# Patient Record
Sex: FEMALE | Race: WHITE | Employment: OTHER | URBAN - METROPOLITAN AREA
[De-identification: names, ages, dates, MRNs, and addresses within clinical notes are randomized per-mention and may not be internally consistent; named-entity substitution may affect disease eponyms.]

---

## 2017-04-06 ENCOUNTER — HOSPITAL ENCOUNTER (OUTPATIENT)
Dept: MAMMOGRAPHY | Age: 62
Discharge: HOME | End: 2017-04-06
Attending: SURGERY | Admitting: SURGERY

## 2017-04-06 DIAGNOSIS — Z12.31 VISIT FOR SCREENING MAMMOGRAM: ICD-10-CM

## 2017-10-16 ENCOUNTER — PROCEDURE VISIT (OUTPATIENT)
Age: 62
End: 2017-10-16

## 2017-10-16 ENCOUNTER — OFFICE VISIT (OUTPATIENT)
Age: 62
End: 2017-10-16

## 2017-10-16 ENCOUNTER — HOSPITAL ENCOUNTER (OUTPATIENT)
Dept: GENERAL RADIOLOGY | Age: 62
Discharge: HOME | End: 2017-10-16
Attending: INTERNAL MEDICINE | Admitting: INTERNAL MEDICINE

## 2017-10-16 VITALS
WEIGHT: 131.6 LBS | SYSTOLIC BLOOD PRESSURE: 146 MMHG | DIASTOLIC BLOOD PRESSURE: 88 MMHG | HEIGHT: 67 IN | BODY MASS INDEX: 20.65 KG/M2

## 2017-10-16 DIAGNOSIS — M94.9 DISORDER OF BONE AND CARTILAGE: Primary | ICD-10-CM

## 2017-10-16 DIAGNOSIS — M81.0 OSTEOPOROSIS, POSTMENOPAUSAL: ICD-10-CM

## 2017-10-16 DIAGNOSIS — Z51.81 MEDICATION MONITORING ENCOUNTER: ICD-10-CM

## 2017-10-16 DIAGNOSIS — M81.0 OSTEOPOROSIS, POSTMENOPAUSAL: Primary | ICD-10-CM

## 2017-10-16 DIAGNOSIS — R82.994 HYPERCALCIURIA: ICD-10-CM

## 2017-10-16 DIAGNOSIS — M89.9 DISORDER OF BONE AND CARTILAGE: Primary | ICD-10-CM

## 2017-10-16 PROCEDURE — 77080 DXA BONE DENSITY AXIAL: CPT | Performed by: INTERNAL MEDICINE

## 2017-10-16 PROCEDURE — 99215 OFFICE O/P EST HI 40 MIN: CPT | Performed by: INTERNAL MEDICINE

## 2017-10-16 RX ORDER — ALENDRONATE SODIUM 70 MG/1
TABLET ORAL
Qty: 12 TABLET | Refills: 4 | Status: SHIPPED | OUTPATIENT
Start: 2017-10-16 | End: 2018-10-16 | Stop reason: SDUPTHER

## 2017-10-16 RX ORDER — HYDROCHLOROTHIAZIDE 25 MG/1
TABLET ORAL
Qty: 90 TABLET | Refills: 4 | Status: SHIPPED | OUTPATIENT
Start: 2017-10-16 | End: 2018-10-16 | Stop reason: SDUPTHER

## 2017-10-16 NOTE — PROGRESS NOTES
[facility] ([facility]) Osteoporosis and [address] [facility] [address], [address]  Phone [phone]  Fax [phone]    NAME: Lucio Novak  : 1955  CONSULT DATE: 2012  MOST RECENT VISIT: 10/10/2016  TODAYS DATE: 10/16/2017    Labs @ [facility] 2017    PROBLEMS. Normal bone density by DXA 2002, lowest T-score -1.0 at the left femoral neck    BMD decreased 8190-7560 at all sites, lowest T-score -2.5 at the spine      BMD decreased 9805-6352, lowest T-score -2.6 in the spine     No fractures  Natural menopause ~ age 48 ()  Hypertension  Breast disease (atypical lobular dysplasia) diagnosed 2001  Hypercalciuria, normal 24-h urine calcium for her is 100-250 mg/d    330 mg/d 2012 on no treatment    360 mg/d 2013 with HCTZ 12.5 mg/d      242 mg/d 2013 with HCTZ 25 mg/d  NEW 2016: Hypokalemia, 3.1, normal 2017 (4.2)    CURRENT MANAGEMENT FOR OSTEOPOROSIS. Calcium, diet 1050 mg/d + multivitamin 200 mg/d = 1250 mg/d    150 mg/d Milk, 300 mg/d Cheese, 300 mg/d Yogurt, 300 mg/d Other   Vitamin D, multivitamin 1000 IU/d     25-OH D 52 ng/mL 2016  Exercise, walks 3 miles 3-4 x weekly, yoga  Pharmacologic therapy, alendronate 70 mg weekly started 2015    HCTZ 12.5 mg/d started 2012, increased to 25 mg/d 2013    PREVIOUS MEDICATIONS FOR OSTEOPOROSIS. Evista 2013-2014, stopped because of expense    OTHER CURRENT MEDICATIONS. Norvasc  OTC MEDICATIONS. Aleck Gains (energy boost)    CHIEF COMPLAINT. Here for followup of osteoporosis and monitoring treatment. No new related signs and symptoms. INTERVAL HISTORY: See problem list for chronic/inactive conditions. From 2015, CTX was high  542  and treatment was started with alendronate. She has been taking alendronate and HCTZ correctly and without side effects. No falls, near-falls or fractures. She feels well over all.      FOR FULL DETAILS OF FAMILY HISTORY, PAST MEDICAL AND SURGICAL HISTORY, SOCIAL HISTORY, AND REVIEW OF SYSTEMS, SEE PATIENT QUESTIONNAIRE OF TODAYS DATE. PHYSICAL EXAMINATION. GENERAL. Well-nourished, well-developed, normally proportioned adult. MUSCULOSKELETAL. Spinal contours are normal.  No spine tenderness to palpation or percussion. Two finger spaces between ribs and pelvis. No joint deformity. No edema. Gait steady without assistance. NEUROLOGICAL. Able to rise from chair without using arms. No apparent focal motor or sensory deficit. Reflexes brisk and symmetric. Coordination appears normal.     BONE DENSITY. Most recent done here using [employer] equipment. T-scores  Initial study: 06/08/2002 L1-L4 -0.7 left fem. neck -1.0   Current study: 10/16/2017 L1-L4 -2.0 left fem. neck -1.6     The table below shows bone mineral density (grams/cm2), the appropriate measure for comparing serial scans. An increase or decrease is significant based on precision studies done at our center according to the ISCD protocol. PA spine Proximal Femur (left)   Date L1-L4 Fem. neck Trochanter Total hip   06/08/2002 0.971 0.740 0.654 0.905   12/19/2005 0.980 0.736 0.627 0.884   08/11/2012 0.767 0.673 0.556 0.828   08/26/2013 0.809 0.684 0.565 0.771   09/08/2014 0.797 0.708 0.572 0.788   09/14/2015 0.758 0.667 0.575 0.777   10/10/2016 0.800 0.668 0.573 0.786   10/16/2017 0.823 0.669 0.618 0.809     IMPRESSION:  BONE DENSITY IS LOW. BETWEEN 2016 AND 2017, BMD INCREASED IN THE TROCHANTER. COMPARED WITH 2015, BEFORE STARTING ALENDRONATE, BMD IS HIGHER NOW IN THE SPINE, TROCHANTER AND TOTAL HIP. Labs: 04/2012 TSH Ca 9.4 Cr 0.8. 09/2015 . 06/2016 K 3.1 Ca 9.2 Cr 0.6.   06/2017 K 4.2 Ca 9.2 Cr 0.7. X-rays viewed: DXA printouts reviewed. ASSESSMENT. Osteoporosis with bone density lower than desirable. Importantly she has had no fractures. BMD in the spine decreased 8798-6596. Alendronate was started 09/2015 and she is doing well since then.

## 2017-10-16 NOTE — PROGRESS NOTES
[facility]) Osteoporosis and [address] [facility] [address] [place] [address]   Phone [phone]  Fax [phone]      NAME: Myrna Cárdenas   : 1955   STUDY DATE: 10/16/2017     REFERRING PROVIDER: Kim Abdul MD    INDICATION(S) FOR PERFORMING THE STUDY:  osteopenia (M89.9)    CLINICAL INFORMATION PROVIDED BY THE PATIENT: 61-year-old woman. She started natural menopause in her early 46s. No history of fragility fractures. No long-term corticosteroid use. She took Evista 2013-2014 (stopped because of expense). Current treatment is alendronate started 2015. EQUIPMENT: [employer]. POSITIONING: Good   REGIONS OF INTEREST: Correct   ARTIFACTS: None    VALID? Yes     T-scores  Initial study: 2002 L1-L4 -0.7 left fem. neck -1.0   Current study: 10/16/2017 L1-L4 -2.0 left fem. neck -1.6     The table below shows bone mineral density (grams/cm2), the appropriate measure for comparing serial scans. An increase or decrease is significant based on precision studies done at our center according to the ISCD protocol. PA spine Proximal Femur (left)   Date L1-L4 Fem. neck Trochanter Total hip   2002 0.971 0.740 0.654 0.905   2005 0.980 0.736 0.627 0.884   2012 0.767 0.673 0.556 0.828   2013 0.809 0.684 0.565 0.771   2014 0.797 0.708 0.572 0.788   2015 0.758 0.667 0.575 0.777   10/10/2016 0.800 0.668 0.573 0.786   10/16/2017 0.823 0.669 0.618 0.809     IMPRESSION:  BONE DENSITY IS LOW. BETWEEN  AND , BMD INCREASED IN THE TROCHANTER. COMPARED WITH , BEFORE STARTING ALENDRONATE, BMD IS HIGHER NOW IN THE SPINE, TROCHANTER AND TOTAL HIP. Consider repeating this study in 1-2 years to assess the patient's progress. _________________________________________________   Arnold Eng MD, Director, [facility] ([facility]) Osteoporosis and [address] [facility]

## 2017-11-06 ENCOUNTER — TELEPHONE (OUTPATIENT)
Age: 62
End: 2017-11-06

## 2017-11-07 NOTE — TELEPHONE ENCOUNTER
Patient fell and fractured her wrist. ER told her sh would need surgery and the patient would like to know if Dr. Michell Cooley can suggest someone for the surgery.  Please advise [phone]

## 2018-05-24 ENCOUNTER — HOSPITAL ENCOUNTER (OUTPATIENT)
Dept: MAMMOGRAPHY | Age: 63
Discharge: HOME | End: 2018-05-24
Attending: SURGERY | Admitting: SURGERY

## 2018-05-24 DIAGNOSIS — Z12.31 VISIT FOR SCREENING MAMMOGRAM: ICD-10-CM

## 2018-10-16 ENCOUNTER — PROCEDURE VISIT (OUTPATIENT)
Dept: ENDOCRINOLOGY | Age: 63
End: 2018-10-16
Payer: COMMERCIAL

## 2018-10-16 ENCOUNTER — HOSPITAL ENCOUNTER (OUTPATIENT)
Dept: GENERAL RADIOLOGY | Age: 63
Discharge: HOME | End: 2018-10-16
Payer: COMMERCIAL

## 2018-10-16 ENCOUNTER — OFFICE VISIT (OUTPATIENT)
Dept: ENDOCRINOLOGY | Age: 63
End: 2018-10-16
Payer: COMMERCIAL

## 2018-10-16 VITALS
DIASTOLIC BLOOD PRESSURE: 78 MMHG | WEIGHT: 132.2 LBS | SYSTOLIC BLOOD PRESSURE: 129 MMHG | HEIGHT: 67 IN | BODY MASS INDEX: 20.75 KG/M2

## 2018-10-16 DIAGNOSIS — M89.9 DISORDER OF BONE AND CARTILAGE: Primary | ICD-10-CM

## 2018-10-16 DIAGNOSIS — M94.9 DISORDER OF BONE AND CARTILAGE: Primary | ICD-10-CM

## 2018-10-16 DIAGNOSIS — R82.994 HYPERCALCIURIA: ICD-10-CM

## 2018-10-16 DIAGNOSIS — M81.0 OSTEOPOROSIS, POSTMENOPAUSAL: Primary | ICD-10-CM

## 2018-10-16 DIAGNOSIS — Z51.81 MEDICATION MONITORING ENCOUNTER: ICD-10-CM

## 2018-10-16 DIAGNOSIS — M81.0 OSTEOPOROSIS, POSTMENOPAUSAL: ICD-10-CM

## 2018-10-16 PROCEDURE — 77080 DXA BONE DENSITY AXIAL: CPT | Performed by: INTERNAL MEDICINE

## 2018-10-16 PROCEDURE — 77080 DXA BONE DENSITY AXIAL: CPT

## 2018-10-16 PROCEDURE — 99214 OFFICE O/P EST MOD 30 MIN: CPT | Performed by: INTERNAL MEDICINE

## 2018-10-16 RX ORDER — AMLODIPINE BESYLATE 2.5 MG/1
2.5 TABLET ORAL DAILY
COMMUNITY

## 2018-10-16 RX ORDER — HYDROCHLOROTHIAZIDE 25 MG/1
TABLET ORAL
Qty: 90 TABLET | Refills: 4 | Status: SHIPPED | OUTPATIENT
Start: 2018-10-16 | End: 2019-10-21 | Stop reason: SDUPTHER

## 2018-10-16 RX ORDER — RALOXIFENE HYDROCHLORIDE 60 MG/1
60 TABLET, FILM COATED ORAL DAILY
Qty: 90 TABLET | Refills: 4 | Status: SHIPPED | OUTPATIENT
Start: 2018-10-16

## 2018-10-16 RX ORDER — ALENDRONATE SODIUM 70 MG/1
TABLET ORAL
Qty: 12 TABLET | Refills: 4 | Status: SHIPPED | OUTPATIENT
Start: 2018-10-16 | End: 2019-12-13 | Stop reason: SDUPTHER

## 2018-10-16 NOTE — PROGRESS NOTES
[address] [place] [address] Osteoporosis and [address]  [address], [address]  Phone [phone]  Fax [phone]    NAME: Juan Jose Swanson  : 1955  CONSULT DATE: 2012  MOST RECENT VISIT: 10/16/2017  TODAYS DATE: 10/16/2018     Labs @ [facility] 2018    PROBLEMS. Normal bone density by DXA 2002, lowest T-score -1.0 at the left femoral neck    BMD decreased 6741-5717 at all sites, lowest T-score -2.5 at the spine      BMD decreased 7823-6226, lowest T-score -2.6 in the spine     R wrist fracture 2017, hard fall on wet floor; healed with good results  Natural menopause ~ age 48 ()  Hypertension  Breast disease (atypical lobular dysplasia) diagnosed 2001  Hypercalciuria, normal 24-h urine calcium for her is 100-250 mg/d    330 mg/d 2012 on no treatment    360 mg/d 2013 with HCTZ 12.5 mg/d      242 mg/d 2013 with HCTZ 25 mg/d  NEW 2016: Hypokalemia, 3.1, normal 2017 (4.2)    CURRENT MANAGEMENT FOR OSTEOPOROSIS. Calcium, diet 1050 mg/d + multivitamin 200 mg/d = 1250 mg/d    150 mg/d Milk, 300 mg/d Cheese, 300 mg/d Yogurt, 300 mg/d Other   Vitamin D, multivitamin 1000 IU/d     25-OH D 35 ng/mL 2018  Exercise, walks 3 miles 3-4 x weekly, yoga  Pharmacologic therapy, alendronate 70 mg weekly started 2015    HCTZ 12.5 mg/d started 2012, increased to 25 mg/d 2013    PREVIOUS MEDICATIONS FOR OSTEOPOROSIS. Evista 2013-2014, stopped because of expense    OTHER CURRENT MEDICATIONS. Norvasc  OTC MEDICATIONS. Zane Gold (energy boost)    CHIEF COMPLAINT. Here for followup of osteoporosis and monitoring treatment. No new related signs and symptoms. INTERVAL HISTORY: See problem list for chronic/inactive conditions. From 2015, CTX was high - 542 - and treatment was started with alendronate. She has been taking alendronate and HCTZ correctly and without side effects. No falls, near-falls or fractures.   She feels well over

## 2019-06-27 ENCOUNTER — HOSPITAL ENCOUNTER (OUTPATIENT)
Dept: MAMMOGRAPHY | Age: 64
Discharge: HOME | End: 2019-06-27
Payer: COMMERCIAL

## 2019-06-27 DIAGNOSIS — Z12.31 VISIT FOR SCREENING MAMMOGRAM: ICD-10-CM

## 2019-06-27 PROCEDURE — 77063 BREAST TOMOSYNTHESIS BI: CPT

## 2019-10-21 ENCOUNTER — OFFICE VISIT (OUTPATIENT)
Dept: ENDOCRINOLOGY | Age: 64
End: 2019-10-21
Payer: COMMERCIAL

## 2019-10-21 ENCOUNTER — HOSPITAL ENCOUNTER (OUTPATIENT)
Dept: GENERAL RADIOLOGY | Age: 64
Discharge: HOME | End: 2019-10-21
Payer: COMMERCIAL

## 2019-10-21 ENCOUNTER — PROCEDURE VISIT (OUTPATIENT)
Dept: ENDOCRINOLOGY | Age: 64
End: 2019-10-21
Payer: COMMERCIAL

## 2019-10-21 VITALS
DIASTOLIC BLOOD PRESSURE: 76 MMHG | WEIGHT: 135.6 LBS | SYSTOLIC BLOOD PRESSURE: 122 MMHG | HEIGHT: 67 IN | BODY MASS INDEX: 21.28 KG/M2

## 2019-10-21 DIAGNOSIS — Z51.81 MEDICATION MONITORING ENCOUNTER: ICD-10-CM

## 2019-10-21 DIAGNOSIS — M81.0 OSTEOPOROSIS, POSTMENOPAUSAL: Primary | ICD-10-CM

## 2019-10-21 DIAGNOSIS — M94.9 DISORDER OF BONE AND CARTILAGE: ICD-10-CM

## 2019-10-21 DIAGNOSIS — R82.994 HYPERCALCIURIA: ICD-10-CM

## 2019-10-21 DIAGNOSIS — Z13.820 SCREENING FOR OSTEOPOROSIS: ICD-10-CM

## 2019-10-21 DIAGNOSIS — M89.9 DISORDER OF BONE AND CARTILAGE: ICD-10-CM

## 2019-10-21 DIAGNOSIS — M81.0 OSTEOPOROSIS, POSTMENOPAUSAL: ICD-10-CM

## 2019-10-21 PROCEDURE — 99214 OFFICE O/P EST MOD 30 MIN: CPT | Performed by: INTERNAL MEDICINE

## 2019-10-21 PROCEDURE — 77080 DXA BONE DENSITY AXIAL: CPT | Performed by: INTERNAL MEDICINE

## 2019-10-21 PROCEDURE — 77080 DXA BONE DENSITY AXIAL: CPT

## 2019-10-21 RX ORDER — HYDROCHLOROTHIAZIDE 25 MG/1
TABLET ORAL
Qty: 90 TABLET | Refills: 4 | Status: SHIPPED | OUTPATIENT
Start: 2019-10-21 | End: 2020-10-26 | Stop reason: SDUPTHER

## 2019-12-13 RX ORDER — ALENDRONATE SODIUM 70 MG/1
TABLET ORAL
Qty: 12 TABLET | Refills: 3 | Status: SHIPPED | OUTPATIENT
Start: 2019-12-13 | End: 2020-10-26 | Stop reason: ALTCHOICE

## 2020-07-30 ENCOUNTER — HOSPITAL ENCOUNTER (OUTPATIENT)
Dept: MAMMOGRAPHY | Age: 65
Discharge: HOME | End: 2020-07-30
Payer: COMMERCIAL

## 2020-07-30 PROCEDURE — 77063 BREAST TOMOSYNTHESIS BI: CPT

## 2020-10-26 ENCOUNTER — HOSPITAL ENCOUNTER (OUTPATIENT)
Dept: GENERAL RADIOLOGY | Age: 65
Discharge: HOME | End: 2020-10-26
Payer: MEDICARE

## 2020-10-26 ENCOUNTER — PROCEDURE VISIT (OUTPATIENT)
Dept: ENDOCRINOLOGY | Age: 65
End: 2020-10-26

## 2020-10-26 ENCOUNTER — OFFICE VISIT (OUTPATIENT)
Dept: ENDOCRINOLOGY | Age: 65
End: 2020-10-26
Payer: MEDICARE

## 2020-10-26 VITALS
HEIGHT: 67 IN | SYSTOLIC BLOOD PRESSURE: 125 MMHG | BODY MASS INDEX: 21.09 KG/M2 | DIASTOLIC BLOOD PRESSURE: 77 MMHG | WEIGHT: 134.4 LBS

## 2020-10-26 PROCEDURE — 3017F COLORECTAL CA SCREEN DOC REV: CPT | Performed by: INTERNAL MEDICINE

## 2020-10-26 PROCEDURE — 99214 OFFICE O/P EST MOD 30 MIN: CPT | Performed by: INTERNAL MEDICINE

## 2020-10-26 PROCEDURE — 1123F ACP DISCUSS/DSCN MKR DOCD: CPT | Performed by: INTERNAL MEDICINE

## 2020-10-26 PROCEDURE — G8420 CALC BMI NORM PARAMETERS: HCPCS | Performed by: INTERNAL MEDICINE

## 2020-10-26 PROCEDURE — G8484 FLU IMMUNIZE NO ADMIN: HCPCS | Performed by: INTERNAL MEDICINE

## 2020-10-26 PROCEDURE — G8399 PT W/DXA RESULTS DOCUMENT: HCPCS | Performed by: INTERNAL MEDICINE

## 2020-10-26 PROCEDURE — 1090F PRES/ABSN URINE INCON ASSESS: CPT | Performed by: INTERNAL MEDICINE

## 2020-10-26 PROCEDURE — 1036F TOBACCO NON-USER: CPT | Performed by: INTERNAL MEDICINE

## 2020-10-26 PROCEDURE — 77080 DXA BONE DENSITY AXIAL: CPT | Performed by: INTERNAL MEDICINE

## 2020-10-26 PROCEDURE — G8427 DOCREV CUR MEDS BY ELIG CLIN: HCPCS | Performed by: INTERNAL MEDICINE

## 2020-10-26 PROCEDURE — 4040F PNEUMOC VAC/ADMIN/RCVD: CPT | Performed by: INTERNAL MEDICINE

## 2020-10-26 PROCEDURE — 77080 DXA BONE DENSITY AXIAL: CPT

## 2020-10-26 RX ORDER — HYDROCHLOROTHIAZIDE 25 MG/1
TABLET ORAL
Qty: 90 TABLET | Refills: 4 | Status: SHIPPED | OUTPATIENT
Start: 2020-10-26 | End: 2021-11-23 | Stop reason: SDUPTHER

## 2020-10-26 NOTE — PROGRESS NOTES
[facility] ([facility]) Osteoporosis and [address] [facility] [address] [place] [address], [address] [place] [address]  Phone [phone]  Fax [phone]    NAME: Jodene Boast  : 1955  CONSULT DATE: 2012  MOST RECENT VISIT: 10/21/2019  TODAYS DATE: 10/26/2020    Labs @ [facility] 2020    PROBLEMS. Normal bone density by DXA 2002, lowest T-score -1.0 at the left femoral neck    BMD decreased 5207-6320 at all sites, lowest T-score -2.5 at the spine      BMD decreased 7921-5423, lowest T-score -2.6 in the spine     R wrist fracture 2017, hard fall on wet floor; healed with good results  Natural menopause ~ age 48 ()  Hypertension  Breast disease (atypical lobular dysplasia) diagnosed 2001    Raloxifene re-started   Hypercalciuria, normal 24-h urine calcium for her is 100-250 mg/d    330 mg/d 2012 on no treatment    360 mg/d 2013 with HCTZ 12.5 mg/d      242 mg/d 2013 with HCTZ 25 mg/d    CURRENT MANAGEMENT FOR OSTEOPOROSIS. Calcium, diet 1050 mg/d + multivitamin 200 mg/d = 1250 mg/d    150 mg/d Milk, 300 mg/d Cheese, 300 mg/d Yogurt, 300 mg/d Other   Vitamin D, multivitamin 1000 IU/d     25-OH D 42 ng/mL 2020  Exercise, walks 3 miles 3-4 x weekly, yoga  Pharmacologic therapy, alendronate 70 mg weekly started 2015    HCTZ 12.5 mg/d started 2012, increased to 25 mg/d 2013    PREVIOUS MEDICATIONS FOR OSTEOPOROSIS. Evista 2013-2014, stopped because of expense    OTHER CURRENT MEDICATIONS. Norvas  OTC MEDICATIONS. Sergio Pina (energy boost)    CHIEF COMPLAINT. Here for followup of osteoporosis and monitoring treatment. No new related signs and symptoms. INTERVAL HISTORY: See problem list for chronic/inactive conditions. From 2015, CTX was high - 542 - and treatment was started with alendronate. She has been taking alendronate and HCTZ correctly and without side effects. No falls, near-falls or fractures. She feels well overall.     FOR FULL DETAILS OF FAMILY HISTORY, PAST MEDICAL AND SURGICAL HISTORY, SOCIAL HISTORY, AND REVIEW OF SYSTEMS, SEE PATIENT QUESTIONNAIRE OF TODAYS DATE. PHYSICAL EXAMINATION. GENERAL. Well-nourished, well-developed, normally proportioned adult. MUSCULOSKELETAL. Spinal contours are normal.  No spine tenderness to palpation or percussion. Two finger spaces between ribs and pelvis. No joint deformity. No edema. Gait steady without assistance. NEUROLOGICAL. Able to rise from chair without using arms. No apparent focal motor or sensory deficit. Reflexes brisk and symmetric. Coordination appears normal.     BONE DENSITY. Most recent done here using [employer] equipment. T-scores  Initial study: 06/08/2002 L1-L4 -0.7 left fem. neck -1.0   Current study: 10/26/2020 L1-L3 -2.4 left fem. neck -1.9     The table below shows bone mineral density (grams/cm2), the appropriate measure for comparing serial scans. An increase or decrease is significant based on precision studies done at our center according to the ISCD protocol. PA spine Proximal Femur (left)   Date L1-L3 Fem. neck Trochanter Total hip   06/08/2002 --- 0.740 0.654 0.905   12/19/2005 --- 0.736 0.627 0.884   08/11/2012 --- 0.673 0.556 0.828   08/26/2013 0.800 0.684 0.565 0.771   09/08/2014 0.788 0.708 0.572 0.788   09/14/2015 0.731 0.667 0.575 0.777   10/10/2016 0.780 0.668 0.573 0.786   10/16/2017 0.806 0.669 0.618 0.809   10/16/2018 0.787 0.658 0.578 0.798   10/21/2019 0.752 0.639 0.574 0.778   10/26/2020 0.760 0.656 0.564 0.779     IMPRESSION:  BONE DENSITY IS LOW. SINCE THE LAST DXA, BMD DID NOT CHANGE SIGNIFICANTLY IN THE SPINE OR LEFT HIP. Labs: 04/2012 TSH Ca 9.4 Cr 0.8. 09/2015 . 06/2016 K 3.1 Ca 9.2 Cr 0.6.    06/2017 K 4.2 Ca 9.2 Cr 0.7. 05/2018 Ca 9.1 Cr 0.7. 05/2019 Ca 9.4 Cr 0.7. 05/2020 Ca 9.3 Cr 0.6. X-rays viewed: DXA printouts reviewed. 12/2018 LS MR.    ASSESSMENT. Osteoporosis with bone density lower than desirable.  Importantly she has had no fractures. BMD in the spine decreased 0291-7562. Alendronate was started 09/2015 and she is doing well since then. Hypercalciuria is controlled with HCTZ. PLANS. She should stop alendronate for a holiday, likely 2 years or longer depending on BMD response (will continue HCTZ 25 mg/d and raloxifene 60 mg daily - the latter being taken to reduce the risk of breast cancer. Return with DXA in 1 year. I spent 25 minutes face to face with this patient. Over 50% of that time was spent on counseling and care coordination. See assessment and plan for counseling and care coordination details. Shai Eng MD, Director, [facility] ([facility]) Osteoporosis and [address]    CC: Jermaine Barnard MD

## 2020-10-26 NOTE — RESULT ENCOUNTER NOTE
[facility]) Osteoporosis and [address] [facility] [address] [place] [address]   Phone [phone]  Fax [phone]    NAME: Crispin Gruber   : 1955   STUDY DATE: 10/26/2020     REFERRING PROVIDER: Maryse Norman MD    INDICATION(S) FOR PERFORMING THE STUDY:  osteopenia (M89.9)    CLINICAL INFORMATION PROVIDED BY THE PATIENT: 59-year-old woman. She started natural menopause in her early 46s. No history of fragility fractures. No long-term corticosteroid use. She took Evista 2013-2014 (stopped because of expense) and alendronate 2015-10/2019. Evista was restarted ~2019. EQUIPMENT: [employer]. POSITIONING: Good. REGIONS OF INTEREST: Correct. ARTIFACTS: None. VALID? Yes. L4 was deleted starting in 2019 due to T-score discrepancy. T-scores  Initial study: 2002 L1-L4 -0.7 left fem. neck -1.0   Current study: 10/26/2020 L1-L3 -2.4 left fem. neck -1.9     The table below shows bone mineral density (grams/cm2), the appropriate measure for comparing serial scans. An increase or decrease is significant based on precision studies done at our center according to the ISCD protocol. PA spine Proximal Femur (left)   Date L1-L3 Fem. neck Trochanter Total hip   2002 --- 0.740 0.654 0.905   2005 --- 0.736 0.627 0.884   2012 --- 0.673 0.556 0.828   2013 0.800 0.684 0.565 0.771   2014 0.788 0.708 0.572 0.788   2015 0.731 0.667 0.575 0.777   10/10/2016 0.780 0.668 0.573 0.786   10/16/2017 0.806 0.669 0.618 0.809   10/16/2018 0.787 0.658 0.578 0.798   10/21/2019 0.752 0.639 0.574 0.778   10/26/2020 0.760 0.656 0.564 0.779     IMPRESSION:  BONE DENSITY IS LOW. SINCE THE LAST DXA, BMD DID NOT CHANGE SIGNIFICANTLY IN THE SPINE OR LEFT HIP. Consider repeating this study in 1-2 years to assess the patient's progress. _________________________________________________   Benjamin Mary Eng MD, Director, [facility]) Osteoporosis and Bone Health Services

## 2021-08-12 ENCOUNTER — HOSPITAL ENCOUNTER (OUTPATIENT)
Dept: MAMMOGRAPHY | Age: 66
Discharge: HOME | End: 2021-08-12
Payer: MEDICARE

## 2021-08-12 VITALS — WEIGHT: 133 LBS | HEIGHT: 67 IN | BODY MASS INDEX: 20.88 KG/M2

## 2021-08-12 DIAGNOSIS — Z12.31 VISIT FOR SCREENING MAMMOGRAM: ICD-10-CM

## 2021-08-12 PROCEDURE — 77063 BREAST TOMOSYNTHESIS BI: CPT

## 2021-11-23 ENCOUNTER — PROCEDURE VISIT (OUTPATIENT)
Dept: ENDOCRINOLOGY | Age: 66
End: 2021-11-23

## 2021-11-23 ENCOUNTER — OFFICE VISIT (OUTPATIENT)
Dept: ENDOCRINOLOGY | Age: 66
End: 2021-11-23
Payer: MEDICARE

## 2021-11-23 ENCOUNTER — HOSPITAL ENCOUNTER (OUTPATIENT)
Dept: GENERAL RADIOLOGY | Age: 66
Discharge: HOME | End: 2021-11-23
Payer: MEDICARE

## 2021-11-23 VITALS
WEIGHT: 134.4 LBS | SYSTOLIC BLOOD PRESSURE: 116 MMHG | DIASTOLIC BLOOD PRESSURE: 71 MMHG | BODY MASS INDEX: 21.09 KG/M2 | HEIGHT: 67 IN

## 2021-11-23 DIAGNOSIS — M81.0 OSTEOPOROSIS, POSTMENOPAUSAL: ICD-10-CM

## 2021-11-23 DIAGNOSIS — M81.0 OSTEOPOROSIS, POSTMENOPAUSAL: Primary | ICD-10-CM

## 2021-11-23 DIAGNOSIS — Z51.81 MEDICATION MONITORING ENCOUNTER: ICD-10-CM

## 2021-11-23 DIAGNOSIS — R82.994 HYPERCALCIURIA: ICD-10-CM

## 2021-11-23 PROCEDURE — G8420 CALC BMI NORM PARAMETERS: HCPCS | Performed by: INTERNAL MEDICINE

## 2021-11-23 PROCEDURE — G8399 PT W/DXA RESULTS DOCUMENT: HCPCS | Performed by: INTERNAL MEDICINE

## 2021-11-23 PROCEDURE — G8428 CUR MEDS NOT DOCUMENT: HCPCS | Performed by: INTERNAL MEDICINE

## 2021-11-23 PROCEDURE — 4040F PNEUMOC VAC/ADMIN/RCVD: CPT | Performed by: INTERNAL MEDICINE

## 2021-11-23 PROCEDURE — 99214 OFFICE O/P EST MOD 30 MIN: CPT | Performed by: INTERNAL MEDICINE

## 2021-11-23 PROCEDURE — 1123F ACP DISCUSS/DSCN MKR DOCD: CPT | Performed by: INTERNAL MEDICINE

## 2021-11-23 PROCEDURE — 1090F PRES/ABSN URINE INCON ASSESS: CPT | Performed by: INTERNAL MEDICINE

## 2021-11-23 PROCEDURE — G8484 FLU IMMUNIZE NO ADMIN: HCPCS | Performed by: INTERNAL MEDICINE

## 2021-11-23 PROCEDURE — 77080 DXA BONE DENSITY AXIAL: CPT | Performed by: INTERNAL MEDICINE

## 2021-11-23 PROCEDURE — 3017F COLORECTAL CA SCREEN DOC REV: CPT | Performed by: INTERNAL MEDICINE

## 2021-11-23 PROCEDURE — 1036F TOBACCO NON-USER: CPT | Performed by: INTERNAL MEDICINE

## 2021-11-23 PROCEDURE — 77080 DXA BONE DENSITY AXIAL: CPT

## 2021-11-23 RX ORDER — HYDROCHLOROTHIAZIDE 25 MG/1
TABLET ORAL
Qty: 90 TABLET | Refills: 4 | Status: SHIPPED | OUTPATIENT
Start: 2021-11-23

## 2021-11-23 NOTE — RESULT ENCOUNTER NOTE
[facility]) Osteoporosis and [address] [facility] [address] [facility], [address] [place] [address],[place] [address]  Phone [phone]  Fax [phone]    NAME: Helen Zheng   : 1940   STUDY DATE: 2021     REFERRING PHYSICIAN: Maty Moreno MD  OTHERS WHO NEED REPORTS: copy for the patient     INDICATION(S) FOR PERFORMING THE STUDY:  osteoporosis, age-related (M81.0)    CLINICAL INFORMATION PROVIDED BY THE PATIENT:  80-year-old woman. She started natural menopause at age 44. She took estrogen from ~ until . Chuck Major No history of fragility fractures. No long-term corticosteroid use. She took Actonel from ~ until 2009. She was treated with Reclast 2009-2012 (stopped for a \"holiday,\" re-started 2018). Family history of osteoprosis (mother, sister). EQUIPMENT: [employer]. POSITIONING: Good. REGIONS OF INTEREST: Correct. ARTIFACTS: None. STUDY VALID? Yes; L1 and L2 were deleted. T-scores  Initial study: 2000 spine L3-L4 -3.0 Lowest hip (left fem. neck) -2.3   Current study: 2021 spine L3-L4 -0.8 Lowest hip (left fem. neck) -2.0     The table below shows bone mineral density (grams/cm2), the appropriate measure for comparing serial scans. An increase or decrease is significant based on precision studies done at our center according to the ISCD protocol. PA spine Proximal Femur (left)   Date L3-L4 Fem. neck Trochanter Total hip   2000 0.771 0.603 0.622 0.778   2004 0.816 0.632 0.642 0.807   2008 0.856 0.627 0.645 0.822   2012 0.859 0.633 0.647 0.794   09/10/2013 0.898 0.622 0.649 0.805   10/17/2017 0.964 0.636 0.649 0.819   10/22/2018 1.007 0.635 0.672 0.829   10/22/2019 0.977 0.650 0.661 0.826   10/26/2020 0.692 0.650 0.829   2021 1.011 0.623 0.653 0.822      IMPRESSION:  BONE DENSITY IS LOW.   SINCE THE PREVIOUS DXA, BMD INCREASED IN THE FEMORAL NECK INCREASED IN THE SPINE AND DID NOT CHANGE SIGNIFICANTLY IN THE TROCHANTER OR TOTAL HIP. IT IS LOWER IN THE FEMORAL NECK BUT SIMILAR TO 2019 AND BEFORE AND ONTLY BORDERLINE LOW THERE. Consider repeating this study in 1-2 years to assess the patient's progress.    ___________________________________________________  Venkat Rios MD, Director, [facility]) Osteoporosis and [address] [place] [address]

## 2021-11-23 NOTE — PROGRESS NOTES
[facility] ([facility]) Osteoporosis and [address] [facility]  [address] [place], [address]  Phone [phone]  Fax [phone]    NAME: Ramo Richardson  : 1955  CONSULT DATE: 2012  MOST RECENT VISIT: 10/26/2020  TODAYS DATE: 2021    Labs @ [facility] 2021  PROBLEMS. Normal bone density by DXA 2002, lowest T-score -1.0 at the left femoral neck    BMD decreased 0772-4412 at all sites, lowest T-score -2.5 at the spine      BMD decreased 5578-8853, lowest T-score -2.6 in the spine     R wrist fracture 2017, hard fall on wet floor; healed with good results  Natural menopause ~ age 48 ()  Hypertension  Breast disease (atypical lobular dysplasia) diagnosed 2001    Raloxifene re-started 2019  Hypercalciuria, normal 24-h urine calcium for her is 100-250 mg/d    330 mg/d 2012 on no treatment    360 mg/d 2013 with HCTZ 12.5 mg/d      242 mg/d 2013 with HCTZ 25 mg/d    CURRENT MANAGEMENT FOR OSTEOPOROSIS. Calcium, diet 1050 mg/d + multivitamin 200 mg/d = 1250 mg/d    150 mg/d Milk, 300 mg/d Cheese, 300 mg/d Yogurt, 300 mg/d Other   Vitamin D, multivitamin 1000 IU/d     25-OH D 49 ng/mL 2021  Exercise, walks 3 miles 3-4 x weekly, yoga  Pharmacologic therapy, alendronate 70 mg weekly 2015-10/2020, stopped for a holiday.     HCTZ 12.5 mg/d started 2012, increased to 25 mg/d 2013    PREVIOUS MEDICATIONS FOR OSTEOPOROSIS. Evista 2013-2014, stopped because of expense    OTHER CURRENT MEDICATIONS. [facility]  OTC MEDICATIONS. [facility] (energy boost)    CHIEF COMPLAINT. Here for followup of osteoporosis and monitoring treatment. No new related signs and symptoms. INTERVAL HISTORY: See problem list for chronic/inactive conditions. She stopped alendronate 10/2020, as directed. No falls, near-falls or fractures. She feels well overall.     FOR FULL DETAILS OF FAMILY HISTORY, PAST MEDICAL AND SURGICAL HISTORY, SOCIAL HISTORY, AND REVIEW OF SYSTEMS, SEE PATIENT QUESTIONNAIRE OF TODAYS DATE. PHYSICAL EXAMINATION. GENERAL. Well-nourished, well-developed, normally proportioned adult. MUSCULOSKELETAL. Spinal contours are normal.  No spine tenderness to palpation or percussion. Two finger spaces between ribs and pelvis. No joint deformity. No edema. Gait steady without assistance. NEUROLOGICAL. Able to rise from chair without using arms. No apparent focal motor or sensory deficit. Reflexes brisk and symmetric. Coordination appears normal.     BONE DENSITY. Most recent done here using [employer] equipment. T-scores  Initial study: 06/08/2002 L1-L4 -0.7 left fem. neck -1.0   Current study: 11/23/2021 L1-L3 -2.2 left fem. neck -1.9     The table below shows bone mineral density (grams/cm2), the appropriate measure for comparing serial scans. An increase or decrease is significant based on precision studies done at our center according to the ISCD protocol. PA spine Proximal Femur (left)   Date L1-L3 Fem. neck Trochanter Total hip   06/08/2002 --- 0.740 0.654 0.905   08/11/2012 --- 0.673 0.556 0.828   08/26/2013 0.800 0.684 0.565 0.771   09/08/2014 0.788 0.708 0.572 0.788   09/14/2015 0.731 0.667 0.575 0.777   10/10/2016 0.780 0.668 0.573 0.786   10/16/2017 0.806 0.669 0.618 0.809   10/16/2018 0.787 0.658 0.578 0.798   10/21/2019 0.752 0.639 0.574 0.778   10/26/2020 0.760 0.656 0.564 0.779   11/23/2021 0.775 0.640 0.533 0.755     IMPRESSION:  BONE DENSITY IS LOW. SINCE THE LAST DXA, BMD DID NOT CHANGE SIGNIFICANTLY IN THE SPINE, FEMORAL NECK OR TOTAL HIP. IT IS LOWER IN THE TROCHANTER BUT ONLY BORDERLINE LOW THERE. Labs: 04/2012 TSH Ca 9.4 Cr 0.8. 09/2015 . 06/2016 K 3.1 Ca 9.2 Cr 0.6.    06/2017 K 4.2 Ca 9.2 Cr 0.7. 05/2018 Ca 9.1 Cr 0.7. 05/2019 Ca 9.4 Cr 0.7. 05/2020 Ca 9.3 Cr 0.6.  06/2021 Ca 9.1 Cr 0.6. X-rays viewed: DXA printouts reviewed. 12/2018 LS MR.    ASSESSMENT.   Osteoporosis with bone density lower than

## 2022-08-25 ENCOUNTER — HOSPITAL ENCOUNTER (OUTPATIENT)
Dept: MAMMOGRAPHY | Age: 67
Discharge: HOME | End: 2022-08-25
Payer: MEDICARE

## 2022-08-25 VITALS — BODY MASS INDEX: 21.03 KG/M2 | HEIGHT: 67 IN | WEIGHT: 134 LBS

## 2022-08-25 DIAGNOSIS — Z12.31 VISIT FOR SCREENING MAMMOGRAM: ICD-10-CM

## 2022-08-25 PROCEDURE — 77063 BREAST TOMOSYNTHESIS BI: CPT

## 2022-08-25 PROCEDURE — 77067 SCR MAMMO BI INCL CAD: CPT

## 2022-12-06 ENCOUNTER — OFFICE VISIT (OUTPATIENT)
Dept: ENDOCRINOLOGY | Age: 67
End: 2022-12-06
Payer: MEDICARE

## 2022-12-06 ENCOUNTER — HOSPITAL ENCOUNTER (OUTPATIENT)
Dept: GENERAL RADIOLOGY | Age: 67
Discharge: HOME | End: 2022-12-06
Payer: MEDICARE

## 2022-12-06 ENCOUNTER — PROCEDURE VISIT (OUTPATIENT)
Dept: ENDOCRINOLOGY | Age: 67
End: 2022-12-06

## 2022-12-06 VITALS
WEIGHT: 137.6 LBS | BODY MASS INDEX: 21.6 KG/M2 | SYSTOLIC BLOOD PRESSURE: 127 MMHG | HEIGHT: 67 IN | DIASTOLIC BLOOD PRESSURE: 66 MMHG

## 2022-12-06 DIAGNOSIS — M81.0 OSTEOPOROSIS, POSTMENOPAUSAL: ICD-10-CM

## 2022-12-06 DIAGNOSIS — M94.9 DISORDER OF BONE AND CARTILAGE: Primary | ICD-10-CM

## 2022-12-06 DIAGNOSIS — M89.9 DISORDER OF BONE AND CARTILAGE: Primary | ICD-10-CM

## 2022-12-06 DIAGNOSIS — M81.0 OSTEOPOROSIS, POSTMENOPAUSAL: Primary | ICD-10-CM

## 2022-12-06 DIAGNOSIS — Z51.81 MEDICATION MONITORING ENCOUNTER: ICD-10-CM

## 2022-12-06 DIAGNOSIS — R82.994 HYPERCALCIURIA: ICD-10-CM

## 2022-12-06 PROCEDURE — 77080 DXA BONE DENSITY AXIAL: CPT | Performed by: INTERNAL MEDICINE

## 2022-12-06 PROCEDURE — 1123F ACP DISCUSS/DSCN MKR DOCD: CPT | Performed by: INTERNAL MEDICINE

## 2022-12-06 PROCEDURE — 1036F TOBACCO NON-USER: CPT | Performed by: INTERNAL MEDICINE

## 2022-12-06 PROCEDURE — 1090F PRES/ABSN URINE INCON ASSESS: CPT | Performed by: INTERNAL MEDICINE

## 2022-12-06 PROCEDURE — G8399 PT W/DXA RESULTS DOCUMENT: HCPCS | Performed by: INTERNAL MEDICINE

## 2022-12-06 PROCEDURE — G8484 FLU IMMUNIZE NO ADMIN: HCPCS | Performed by: INTERNAL MEDICINE

## 2022-12-06 PROCEDURE — 99214 OFFICE O/P EST MOD 30 MIN: CPT | Performed by: INTERNAL MEDICINE

## 2022-12-06 PROCEDURE — G8420 CALC BMI NORM PARAMETERS: HCPCS | Performed by: INTERNAL MEDICINE

## 2022-12-06 PROCEDURE — 77080 DXA BONE DENSITY AXIAL: CPT

## 2022-12-06 PROCEDURE — 3017F COLORECTAL CA SCREEN DOC REV: CPT | Performed by: INTERNAL MEDICINE

## 2022-12-06 PROCEDURE — G8427 DOCREV CUR MEDS BY ELIG CLIN: HCPCS | Performed by: INTERNAL MEDICINE

## 2022-12-06 RX ORDER — AMOXICILLIN AND CLAVULANATE POTASSIUM 875; 125 MG/1; MG/1
TABLET, FILM COATED ORAL
COMMUNITY
Start: 2022-11-30

## 2022-12-06 RX ORDER — HYDROCHLOROTHIAZIDE 25 MG/1
TABLET ORAL
Qty: 90 TABLET | Refills: 4 | Status: SHIPPED | OUTPATIENT
Start: 2022-12-06

## 2022-12-06 RX ORDER — PREDNISONE 10 MG/1
TABLET ORAL
COMMUNITY
Start: 2022-11-30

## 2022-12-06 RX ORDER — RALOXIFENE HYDROCHLORIDE 60 MG/1
60 TABLET, FILM COATED ORAL DAILY
Qty: 90 TABLET | Refills: 4
Start: 2018-10-16

## 2022-12-06 NOTE — RESULT ENCOUNTER NOTE
[facility]) Osteoporosis and [address] [facility] [address] [facility], [address] [facility],[place] [address]  Phone [phone]  Fax [phone]    NAME: Dominick Hall   : 1955   STUDY DATE: 2022     REFERRING PROVIDER: Charlene Robertson MD    INDICATION(S) FOR PERFORMING THE STUDY:  osteopenia (M89.9)    CLINICAL INFORMATION PROVIDED BY THE PATIENT: 29-year-old woman. She started natural menopause in her early 46s. No history of fragility fractures. No long-term corticosteroid use. She took Evista 2013-2014 (stopped because of expense) and alendronate 2015-10/2020. Evista was restarted ~2019. EQUIPMENT: [employer]. POSITIONING: Good. REGIONS OF INTEREST: Correct. ARTIFACTS: None. VALID? Yes. L4 was deleted starting in 2019 due to T-score discrepancy. T-scores  Initial study: 2002 L1-L4 -0.7 left fem. neck -1.0   Current study: 2022 L1-L3 -2.4 left fem. neck -1.6     The table below shows bone mineral density (grams/cm2), the appropriate measure for comparing serial scans. An increase or decrease is significant based on precision studies done at our center according to the ISCD protocol. PA spine Proximal Femur (left)   Date L1-L3 Fem. neck Trochanter Total hip   2002 --- 0.740 0.654 0.905   2012 --- 0.673 0.556 0.828   2013 0.800 0.684 0.565 0.771   2014 0.788 0.708 0.572 0.788   2015 0.731 0.667 0.575 0.777   10/26/2020 0.760 0.656 0.564 0.779   2021 0.775 0.640 0.533 0.755   2022 0.757 0.691 0.534 0.752     IMPRESSION:  BONE DENSITY IS LOW. SINCE THE LAST DXA, BMD INCREASED IN THE FEMORAL NECK AND DID NOT CHANGE SIGNIFICANTLY AT OTHER SITES MEASURED. Consider repeating this study in 1-2 years to assess the patient's progress. _________________________________________________   Anoop Eng MD, Director, [facility]) Osteoporosis and [address] [place] [address]

## 2022-12-06 NOTE — PROGRESS NOTES
[facility] ([facility]) Osteoporosis and [address] [facility]  [place] [address], [address]  Phone [phone]  Fax [phone]    NAME: Mayito Rao  : 1955  CONSULT DATE: 2012  MOST RECENT VISIT: 2021  TODAY'S DATE: 2022    Labs @ [facility] 2022    PROBLEMS. Normal bone density by DXA 2002, lowest T-score -1.0 at the left femoral neck    BMD decreased 3316-8986 at all sites, lowest T-score -2.5 at the spine      BMD decreased 0037-7201, lowest T-score -2.6 in the spine     R wrist fracture 2017, hard fall on wet floor; healed with good results  Natural menopause ~ age 48 ()  Hypertension  Breast disease (atypical lobular dysplasia) diagnosed 2001    Raloxifene re-started 2019  Hypercalciuria, normal 24-h urine calcium for her is 100-250 mg/d    330 mg/d 2012 on no treatment    360 mg/d 2013 with HCTZ 12.5 mg/d      242 mg/d 2013 with HCTZ 25 mg/d    CURRENT MANAGEMENT FOR OSTEOPOROSIS. Calcium, diet 1050 mg/d + multivitamin 200 mg/d = 1250 mg/d    150 mg/d Milk, 300 mg/d Cheese, 300 mg/d Yogurt, 300 mg/d Other   Vitamin D, multivitamin 1000 IU/d     25-OH D 49 ng/mL 2021  Exercise, walks 3 miles 3-4 x weekly, yoga  Pharmacologic therapy, alendronate 70 mg weekly 2015-10/2020, stopped for a holiday.     HCTZ 12.5 mg/d started 2012, increased to 25 mg/d 2013    Evista restarted     PREVIOUS MEDICATIONS FOR OSTEOPOROSIS. Evista 2013-2014, stopped because of expense    OTHER CURRENT MEDICATIONS. [facility]  OTC MEDICATIONS. Yuri Baldwin (energy boost)    CHIEF COMPLAINT. Here for followup of osteoporosis and monitoring treatment. No new related signs and symptoms. INTERVAL HISTORY: See problem list for chronic/inactive conditions. She stopped alendronate 10/2020, as directed. No falls, near-falls or fractures. She feels well overall.     FOR FULL DETAILS OF FAMILY HISTORY, PAST MEDICAL AND SURGICAL HISTORY, SOCIAL HISTORY, AND REVIEW OF SYSTEMS, SEE PATIENT QUESTIONNAIRE OF TODAY'S DATE. PHYSICAL EXAMINATION. GENERAL. Well-nourished, well-developed, normally proportioned adult. MUSCULOSKELETAL. Spinal contours are normal.  No spine tenderness to palpation or percussion. Two finger spaces between ribs and pelvis. No joint deformity. No edema. Gait steady without assistance. NEUROLOGICAL. Able to rise from chair without using arms. No apparent focal motor or sensory deficit. Reflexes brisk and symmetric. Coordination appears normal.     BONE DENSITY. Most recent done here using Hologic equipment. T-scores  Initial study: 06/08/2002 L1-L4 -0.7 left fem. neck -1.0   Current study: 12/06/2022 L1-L3 -2.4 left fem. neck -1.6     The table below shows bone mineral density (grams/cm2), the appropriate measure for comparing serial scans. An increase or decrease is significant based on precision studies done at our center according to the ISCD protocol. PA spine Proximal Femur (left)   Date L1-L3 Fem. neck Trochanter Total hip   06/08/2002 --- 0.740 0.654 0.905   08/11/2012 --- 0.673 0.556 0.828   08/26/2013 0.800 0.684 0.565 0.771   09/08/2014 0.788 0.708 0.572 0.788   09/14/2015 0.731 0.667 0.575 0.777   10/26/2020 0.760 0.656 0.564 0.779   11/23/2021 0.775 0.640 0.533 0.755   12/06/2022 0.757 0.691 0.534 0.752     IMPRESSION:  BONE DENSITY IS LOW. SINCE THE LAST DXA, BMD INCREASED IN THE FEMORAL NECK AND DID NOT CHANGE SIGNIFICANTLY AT OTHER SITES MEASURED. Labs: 04/2012 TSH Ca 9.4 Cr 0.8. 09/2015 . 06/2016 K 3.1 Ca 9.2 Cr 0.6.    06/2017 K 4.2 Ca 9.2 Cr 0.7. 05/2018 Ca 9.1 Cr 0.7. 05/2019 Ca 9.4 Cr 0.7. 05/2020 Ca 9.3 Cr 0.6.  06/2021 Ca 9.1 Cr 0.6. 06/2022 Ca 9.6 Cr 0.9. X-rays viewed: DXA printouts reviewed. 12/2018 LS MR.    ASSESSMENT. Osteoporosis with bone density lower than desirable. Importantly she has had no fractures. BMD in the spine decreased 4628-4918.  She took alendronate 09/2015-10/2020 with stable BMD off treatment, initially for a holiday, likely now maintained because of successful treatment for hypercalciuria which is controlled with HCTZ. PLANS. Remain off alendronate for a continued holiday.   Continue HCTZ 25 mg/d and raloxifene 60 mg daily - the latter being taken to reduce the risk of breast cancer. Return with DXA in 1 year. Time spent today: 30-39 minutes. Heath Eng MD, Director, [facility]) Osteoporosis and [address] [place] [address]    CC: Frederick Hernandez MD

## 2023-11-10 ENCOUNTER — ANESTHESIA EVENT (OUTPATIENT)
Dept: ENDOSCOPY | Age: 68
End: 2023-11-10
Payer: MEDICARE

## 2023-11-13 ENCOUNTER — ANESTHESIA (OUTPATIENT)
Dept: ENDOSCOPY | Age: 68
End: 2023-11-13
Payer: MEDICARE

## 2023-11-13 ENCOUNTER — HOSPITAL ENCOUNTER (OUTPATIENT)
Age: 68
Setting detail: OUTPATIENT SURGERY
Discharge: HOME | End: 2023-11-13
Attending: INTERNAL MEDICINE | Admitting: INTERNAL MEDICINE
Payer: MEDICARE

## 2023-11-13 VITALS
TEMPERATURE: 97.2 F | WEIGHT: 136 LBS | HEIGHT: 67 IN | DIASTOLIC BLOOD PRESSURE: 77 MMHG | SYSTOLIC BLOOD PRESSURE: 121 MMHG | BODY MASS INDEX: 21.35 KG/M2 | OXYGEN SATURATION: 100 % | RESPIRATION RATE: 16 BRPM | HEART RATE: 63 BPM

## 2023-11-13 PROCEDURE — 3700000001 HC ADD 15 MINUTES (ANESTHESIA): Performed by: INTERNAL MEDICINE

## 2023-11-13 PROCEDURE — 7100000010 HC PHASE II RECOVERY - FIRST 15 MIN: Performed by: INTERNAL MEDICINE

## 2023-11-13 PROCEDURE — 3700000000 HC ANESTHESIA ATTENDED CARE: Performed by: INTERNAL MEDICINE

## 2023-11-13 PROCEDURE — 7100000011 HC PHASE II RECOVERY - ADDTL 15 MIN: Performed by: INTERNAL MEDICINE

## 2023-11-13 PROCEDURE — 2580000003 HC RX 258: Performed by: NURSE ANESTHETIST, CERTIFIED REGISTERED

## 2023-11-13 PROCEDURE — 2500000003 HC RX 250 WO HCPCS: Performed by: NURSE ANESTHETIST, CERTIFIED REGISTERED

## 2023-11-13 PROCEDURE — 3609027000 HC COLONOSCOPY: Performed by: INTERNAL MEDICINE

## 2023-11-13 PROCEDURE — 6360000002 HC RX W HCPCS: Performed by: NURSE ANESTHETIST, CERTIFIED REGISTERED

## 2023-11-13 RX ORDER — SODIUM CHLORIDE, SODIUM LACTATE, POTASSIUM CHLORIDE, CALCIUM CHLORIDE 600; 310; 30; 20 MG/100ML; MG/100ML; MG/100ML; MG/100ML
INJECTION, SOLUTION INTRAVENOUS CONTINUOUS PRN
Status: DISCONTINUED | OUTPATIENT
Start: 2023-11-13 | End: 2023-11-13 | Stop reason: SDUPTHER

## 2023-11-13 RX ORDER — LIDOCAINE HYDROCHLORIDE 20 MG/ML
INJECTION, SOLUTION INFILTRATION; PERINEURAL PRN
Status: DISCONTINUED | OUTPATIENT
Start: 2023-11-13 | End: 2023-11-13 | Stop reason: SDUPTHER

## 2023-11-13 RX ORDER — BUDESONIDE 0.25 MG/2ML
1 INHALANT ORAL
COMMUNITY

## 2023-11-13 RX ORDER — SODIUM CHLORIDE, SODIUM LACTATE, POTASSIUM CHLORIDE, CALCIUM CHLORIDE 600; 310; 30; 20 MG/100ML; MG/100ML; MG/100ML; MG/100ML
INJECTION, SOLUTION INTRAVENOUS CONTINUOUS
Status: DISCONTINUED | OUTPATIENT
Start: 2023-11-13 | End: 2023-11-13 | Stop reason: HOSPADM

## 2023-11-13 RX ORDER — PROPOFOL 10 MG/ML
INJECTION, EMULSION INTRAVENOUS PRN
Status: DISCONTINUED | OUTPATIENT
Start: 2023-11-13 | End: 2023-11-13 | Stop reason: SDUPTHER

## 2023-11-13 RX ADMIN — SODIUM CHLORIDE, SODIUM LACTATE, POTASSIUM CHLORIDE, AND CALCIUM CHLORIDE: .6; .31; .03; .02 INJECTION, SOLUTION INTRAVENOUS at 10:18

## 2023-11-13 RX ADMIN — PROPOFOL 80 MG: 10 INJECTION, EMULSION INTRAVENOUS at 10:22

## 2023-11-13 RX ADMIN — PROPOFOL 150 MCG/KG/MIN: 10 INJECTION, EMULSION INTRAVENOUS at 10:23

## 2023-11-13 RX ADMIN — LIDOCAINE HYDROCHLORIDE 100 MG: 20 INJECTION, SOLUTION INFILTRATION; PERINEURAL at 10:22

## 2023-11-13 ASSESSMENT — PAIN - FUNCTIONAL ASSESSMENT: PAIN_FUNCTIONAL_ASSESSMENT: 0-10

## 2023-11-13 ASSESSMENT — PAIN SCALES - GENERAL
PAINLEVEL_OUTOF10: 0

## 2023-11-13 NOTE — H&P
Gastroenterology Note             Pre-operative History and Physical    Patient: Silverio Jackson  : 1955  CSN: [id number]    History Obtained From:  patient and/or guardian. HISTORY OF PRESENT ILLNESS:    The patient is a 76 y.o. female  here for colon cancer screening. Past Medical History:    History reviewed. No pertinent past medical history. Past Surgical History:    Past Surgical History:   Procedure Laterality Date    BREAST BIOPSY Right     patient states bx was atypical lobular hyperplasia    HYSTERECTOMY (CERVIX STATUS UNKNOWN)       Medications Prior to Admission:   No current facility-administered medications on file prior to encounter. Current Outpatient Medications on File Prior to Encounter   Medication Sig Dispense Refill    budesonide (PULMICORT) 0.25 MG/2ML nebulizer suspension Take 2 mLs by nebulization in the morning and 2 mLs in the evening. Uses as a sinus rinse. amoxicillin-clavulanate (AUGMENTIN) 875-125 MG per tablet TAKE 1 TABLET BY MOUTH TWICE A DAY FOR 10 DAYS      predniSONE (DELTASONE) 10 MG tablet 4 PILLS FOR 3 DAYS THEN 3 PILLS FOR 3 DAYS THEN 2 PILLS FOR 3 DAYS THEN 1 PILL FOR 3DAYS      hydroCHLOROthiazide (HYDRODIURIL) 25 MG tablet One daily each morning 90 tablet 4    raloxifene (EVISTA) 60 MG tablet Take 1 tablet by mouth daily 90 tablet 4    amLODIPine (NORVASC) 2.5 MG tablet Take 1 tablet by mouth daily      Irbesartan (AVAPRO PO) Take by mouth      Montelukast Sodium (SINGULAIR PO) Take by mouth      multivitamin (THERAGRAN) per tablet Take 1 tablet by mouth daily Indications: 200mg calcium; 1000IU vitamin D          Allergies:  Patient has no known allergies.       Social History:   Social History     Tobacco Use    Smoking status: Never    Smokeless tobacco: Never    Tobacco comments:     socially in college   Substance Use Topics    Alcohol use: Yes     Comment: one or two glasses per night     Family History:   Family History   Problem

## 2023-11-13 NOTE — PROCEDURES
Colonoscopy Procedure  Note          Patient: West Gutierrez  : 1955  CRN:  @MZE@    Procedure: Colonoscopy    Date:  2023    Surgeon:  Lawanda Sánchez MD, MD    Referring Physician:  Taya Monroy    Preoperative Diagnosis:  Colon cancer screening [Z12.11]    Postoperative Diagnosis:  * No post-op diagnosis entered *    Anesthesia:  MAC    EBL: Minimal to none. Indications: This is a 76y.o. year old female who presents today with screening for colon cancer. Previous colonoscopy was 11 years ago. Procedure: An informed consent was obtained from the patient after explanation of indications, benefits, possible risks and complications of the procedure. The patient was then taken to the endoscopy suite, placed in the left lateral decubitus position, and the above IV anesthesia was administered. Digital rectal examination was performed. With the patient in the left lateral decubitus position the endoscope was inserted through the anorectal area into the rectum. The scope was then advanced through the length of the colon to the terminal ileum. The quality of preparation was adequate. The scope was carefully withdrawn and the mucosa was fully inspected including retroflexion in the rectum. Findings and interventions are described below. The patient tolerated the procedure well and was taken to the PACU in good condition. There were no immediate complications. Impression:    Normal terminal ileum. Examination of the colon mucosa was normal without any evidence of polyps/masses/inflammation or diverticulosis. Recommendations:    Screening colonoscopy in 10 years. High-fiber diet. Lawanda Sánchez MD, MD Samina Craig  2023      Please note that some or all of this record was generated using voice recognition software. If there are any questions about the content of this document, please contact the author as some errors in translation may have occurred.

## 2023-11-13 NOTE — ANESTHESIA POSTPROCEDURE EVALUATION
Department of Anesthesiology  Postprocedure Note    Patient: Carlos Saxena  MRN: [id number]  YOB: 1955  Date of evaluation: 11/13/2023      Procedure Summary       Date: 11/13/23 Room / Location: Hussein [facility] 03 / [facility] [address] [facility]    Anesthesia Start: 1018 Anesthesia Stop: 1039    Procedure: COLONOSCOPY Diagnosis:       Colon cancer screening      (Colon cancer screening [Z12.11])    Surgeons: Cr Nicole MD Responsible Provider: Mickie Sanches MD    Anesthesia Type: MAC ASA Status: 2            Anesthesia Type: No value filed.     Yumiko Phase I: Yumiko Score: 10    Yumiko Phase II: Yumiko Score: 10      Anesthesia Post Evaluation    Patient location during evaluation: PACU  Patient participation: complete - patient participated  Level of consciousness: awake  Pain score: 0  Airway patency: patent  Nausea & Vomiting: no nausea  Complications: no  Cardiovascular status: hemodynamically stable  Respiratory status: acceptable  Hydration status: stable  Pain management: adequate

## 2023-11-13 NOTE — ANESTHESIA PRE PROCEDURE
tobacco: Never    Tobacco comments:     socially in college   Substance Use Topics    Alcohol use: Yes     Comment: wine                                Counseling given: Not Answered  Tobacco comments: socially in college      Vital Signs (Current): There were no vitals filed for this visit. BP Readings from Last 3 Encounters:   12/06/22 127/66   11/23/21 116/71   10/26/20 125/77       NPO Status:                                                                                 BMI:   Wt Readings from Last 3 Encounters:   12/06/22 62.4 kg (137 lb 9.6 oz)   08/25/22 60.8 kg (134 lb)   11/23/21 61 kg (134 lb 6.4 oz)     There is no height or weight on file to calculate BMI.    CBC: No results found for: \"WBC\", \"RBC\", \"HGB\", \"HCT\", \"MCV\", \"RDW\", \"PLT\"    CMP:   Lab Results   Component Value Date/Time     03/04/2013 09:04 AM    K 3.6 10/10/2016 03:36 PM     03/04/2013 09:04 AM    CO2 34 03/04/2013 09:04 AM    BUN 18 03/04/2013 09:04 AM    CREATININE 0.8 03/04/2013 09:04 AM    CREATININE 0.8 03/04/2013 09:04 AM    GFRAA >60 03/04/2013 09:04 AM    GLUCOSE 82 03/04/2013 09:04 AM    CALCIUM 9.1 03/04/2013 09:04 AM       POC Tests: No results for input(s): \"POCGLU\", \"POCNA\", \"POCK\", \"POCCL\", \"POCBUN\", \"POCHEMO\", \"POCHCT\" in the last 72 hours.     Coags: No results found for: \"PROTIME\", \"INR\", \"APTT\"    HCG (If Applicable): No results found for: \"PREGTESTUR\", \"PREGSERUM\", \"HCG\", \"HCGQUANT\"     ABGs: No results found for: \"PHART\", \"PO2ART\", \"MGU6FPX\", \"NBM4IBM\", \"BEART\", \"K9FCLCIN\"     Type & Screen (If Applicable):  No results found for: \"LABABO\", \"LABRH\"    Drug/Infectious Status (If Applicable):  No results found for: \"HIV\", \"HEPCAB\"    COVID-19 Screening (If Applicable): No results found for: \"COVID19\"        Anesthesia Evaluation  Patient summary reviewed and Nursing notes reviewed  Airway: Mallampati: III  TM distance: >3 FB   Neck ROM: full  Mouth opening: > = 3 FB

## 2024-01-29 ENCOUNTER — HOSPITAL ENCOUNTER (OUTPATIENT)
Dept: GENERAL RADIOLOGY | Age: 69
Discharge: HOME | End: 2024-01-29
Payer: MEDICARE

## 2024-01-29 ENCOUNTER — OFFICE VISIT (OUTPATIENT)
Dept: ENDOCRINOLOGY | Age: 69
End: 2024-01-29
Payer: MEDICARE

## 2024-01-29 VITALS
HEART RATE: 77 BPM | SYSTOLIC BLOOD PRESSURE: 132 MMHG | RESPIRATION RATE: 14 BRPM | WEIGHT: 141 LBS | TEMPERATURE: 57.2 F | HEIGHT: 66 IN | BODY MASS INDEX: 22.66 KG/M2 | DIASTOLIC BLOOD PRESSURE: 82 MMHG

## 2024-01-29 DIAGNOSIS — Z51.81 MEDICATION MONITORING ENCOUNTER: ICD-10-CM

## 2024-01-29 DIAGNOSIS — R82.994 HYPERCALCIURIA: ICD-10-CM

## 2024-01-29 DIAGNOSIS — M81.0 OSTEOPOROSIS, POSTMENOPAUSAL: ICD-10-CM

## 2024-01-29 DIAGNOSIS — M81.0 OSTEOPOROSIS, POSTMENOPAUSAL: Primary | ICD-10-CM

## 2024-01-29 PROCEDURE — 77080 DXA BONE DENSITY AXIAL: CPT | Performed by: INTERNAL MEDICINE

## 2024-01-29 PROCEDURE — 77080 DXA BONE DENSITY AXIAL: CPT

## 2024-01-29 PROCEDURE — 1090F PRES/ABSN URINE INCON ASSESS: CPT | Performed by: INTERNAL MEDICINE

## 2024-01-29 PROCEDURE — 99214 OFFICE O/P EST MOD 30 MIN: CPT | Performed by: INTERNAL MEDICINE

## 2024-01-29 PROCEDURE — 3017F COLORECTAL CA SCREEN DOC REV: CPT | Performed by: INTERNAL MEDICINE

## 2024-01-29 PROCEDURE — G8484 FLU IMMUNIZE NO ADMIN: HCPCS | Performed by: INTERNAL MEDICINE

## 2024-01-29 PROCEDURE — 1036F TOBACCO NON-USER: CPT | Performed by: INTERNAL MEDICINE

## 2024-01-29 PROCEDURE — G8420 CALC BMI NORM PARAMETERS: HCPCS | Performed by: INTERNAL MEDICINE

## 2024-01-29 PROCEDURE — G8427 DOCREV CUR MEDS BY ELIG CLIN: HCPCS | Performed by: INTERNAL MEDICINE

## 2024-01-29 PROCEDURE — G8399 PT W/DXA RESULTS DOCUMENT: HCPCS | Performed by: INTERNAL MEDICINE

## 2024-01-29 PROCEDURE — 1123F ACP DISCUSS/DSCN MKR DOCD: CPT | Performed by: INTERNAL MEDICINE

## 2024-01-29 RX ORDER — HYDROCHLOROTHIAZIDE 25 MG/1
TABLET ORAL
Qty: 90 TABLET | Refills: 4 | Status: SHIPPED | OUTPATIENT
Start: 2024-01-29

## 2024-01-29 RX ORDER — ALENDRONATE SODIUM 70 MG/1
70 TABLET ORAL WEEKLY
Qty: 12 TABLET | Refills: 4 | Status: SHIPPED | OUTPATIENT
Start: 2024-01-29

## 2024-01-29 NOTE — PROGRESS NOTES
[facility]  [address] [place] [address] [address] [place], [place] [id number]  Phone [phone]  Fax [phone]    NAME: VEDA CASTRO  : 1955  CONSULT DATE: 2012  MOST RECENT VISIT: 2022  TODAY'S DATE: 2024    Labs @ [facility] 2023    PROBLEMS.  Normal bone density by DXA 2002, lowest T-score -1.0 at the left femoral neck    BMD decreased 3534-4971 at all sites, lowest T-score -2.5 at the spine      BMD decreased 8668-5507, lowest T-score -2.6 in the spine     R wrist fracture 2017, hard fall on wet floor; healed with good results  Natural menopause ~ age 50 ()  Hypertension  Breast disease (atypical lobular dysplasia) diagnosed 2001    Raloxifene re-started 2019  Hypercalciuria, normal 24-h urine calcium for her is 100-250 mg/d    330 mg/d 2012 on no treatment    360 mg/d 2013 with HCTZ 12.5 mg/d      242 mg/d 2013 with HCTZ 25 mg/d    CURRENT MANAGEMENT FOR OSTEOPOROSIS.  Calcium, diet 1050 mg/d + multivitamin 200 mg/d = 1250 mg/d    150 mg/d Milk, 300 mg/d Cheese, 300 mg/d Yogurt, 300 mg/d Other   Vitamin D, multivitamin 1000 IU/d     25-OH D 49 ng/mL 2021  Exercise, walks 3 miles 3-4 x weekly, yoga  Pharmacologic therapy, alendronate 70 mg weekly 2015-10/2020, stopped for a “holiday”    HCTZ 12.5 mg/d started 2012, increased to 25 mg/d 2013    PREVIOUS MEDICATIONS FOR OSTEOPOROSIS.   Evista 2013-2014, stopped because of expense    OTHER CURRENT MEDICATIONS. Norvasc  OTC MEDICATIONS. Ziga moringa (energy boost)    CHIEF COMPLAINT. Here for followup of osteoporosis and monitoring treatment.  No new related signs and symptoms.    INTERVAL HISTORY: See problem list for chronic/inactive conditions.   She stopped alendronate 10/2020, as directed.  No falls, near-falls or fractures.  She feels well overall.    FOR FULL DETAILS OF FAMILY HISTORY, PAST MEDICAL AND SURGICAL HISTORY, SOCIAL HISTORY, AND REVIEW OF

## 2024-10-01 ENCOUNTER — TELEPHONE (OUTPATIENT)
Dept: ENDOCRINOLOGY | Age: 69
End: 2024-10-01

## 2024-10-01 NOTE — TELEPHONE ENCOUNTER
Pt is experiencing difficulties with the Fosomax 70 mg- she is having headaches, a crown replaced, jaw aches and teeth pain. Joint pain throughout the past year. Can you please call and advise pt what we can do.

## 2024-10-02 NOTE — TELEPHONE ENCOUNTER
None of those complaints is likely due to Fosamax. She took it 09/2015-10/2020 with no problems.  If she wants to, she could skip a week or two to see if her problems get better but start back again to see if they recur.

## 2024-10-02 NOTE — TELEPHONE ENCOUNTER
Pt called back and read message verbatim. Pt states just because she took it in the past doesn't mean it wouldn't cause problems now. Pt states she will stop taking it for 1-2 weeks and see

## 2025-02-08 DIAGNOSIS — R82.994 HYPERCALCIURIA: ICD-10-CM

## 2025-02-08 DIAGNOSIS — M81.0 OSTEOPOROSIS, POSTMENOPAUSAL: Primary | ICD-10-CM

## 2025-02-10 RX ORDER — HYDROCHLOROTHIAZIDE 25 MG/1
TABLET ORAL
Qty: 90 TABLET | Refills: 4 | Status: SHIPPED | OUTPATIENT
Start: 2025-02-10

## 2025-02-11 ENCOUNTER — OFFICE VISIT (OUTPATIENT)
Dept: ENDOCRINOLOGY | Age: 70
End: 2025-02-11
Payer: MEDICARE

## 2025-02-11 ENCOUNTER — HOSPITAL ENCOUNTER (OUTPATIENT)
Dept: GENERAL RADIOLOGY | Age: 70
Discharge: HOME | End: 2025-02-11
Payer: MEDICARE

## 2025-02-11 VITALS — HEIGHT: 66 IN | WEIGHT: 138.8 LBS | BODY MASS INDEX: 22.31 KG/M2

## 2025-02-11 DIAGNOSIS — M81.0 OSTEOPOROSIS, POSTMENOPAUSAL: ICD-10-CM

## 2025-02-11 DIAGNOSIS — M81.0 OSTEOPOROSIS, POSTMENOPAUSAL: Primary | ICD-10-CM

## 2025-02-11 DIAGNOSIS — R82.994 HYPERCALCIURIA: ICD-10-CM

## 2025-02-11 DIAGNOSIS — Z51.81 MEDICATION MONITORING ENCOUNTER: ICD-10-CM

## 2025-02-11 PROCEDURE — G8420 CALC BMI NORM PARAMETERS: HCPCS | Performed by: INTERNAL MEDICINE

## 2025-02-11 PROCEDURE — G8399 PT W/DXA RESULTS DOCUMENT: HCPCS | Performed by: INTERNAL MEDICINE

## 2025-02-11 PROCEDURE — G8427 DOCREV CUR MEDS BY ELIG CLIN: HCPCS | Performed by: INTERNAL MEDICINE

## 2025-02-11 PROCEDURE — 1036F TOBACCO NON-USER: CPT | Performed by: INTERNAL MEDICINE

## 2025-02-11 PROCEDURE — 99214 OFFICE O/P EST MOD 30 MIN: CPT | Performed by: INTERNAL MEDICINE

## 2025-02-11 PROCEDURE — 3017F COLORECTAL CA SCREEN DOC REV: CPT | Performed by: INTERNAL MEDICINE

## 2025-02-11 PROCEDURE — 1159F MED LIST DOCD IN RCRD: CPT | Performed by: INTERNAL MEDICINE

## 2025-02-11 PROCEDURE — 1123F ACP DISCUSS/DSCN MKR DOCD: CPT | Performed by: INTERNAL MEDICINE

## 2025-02-11 PROCEDURE — 1090F PRES/ABSN URINE INCON ASSESS: CPT | Performed by: INTERNAL MEDICINE

## 2025-02-11 PROCEDURE — 77080 DXA BONE DENSITY AXIAL: CPT

## 2025-02-11 PROCEDURE — G2211 COMPLEX E/M VISIT ADD ON: HCPCS | Performed by: INTERNAL MEDICINE

## 2025-02-11 RX ORDER — ALENDRONATE SODIUM 70 MG/1
70 TABLET ORAL WEEKLY
Qty: 12 TABLET | Refills: 4 | Status: SHIPPED | OUTPATIENT
Start: 2025-02-11

## 2025-02-11 RX ORDER — HYDROCHLOROTHIAZIDE 25 MG/1
TABLET ORAL
Qty: 90 TABLET | Refills: 4 | Status: SHIPPED | OUTPATIENT
Start: 2025-02-11